# Patient Record
Sex: FEMALE | Race: WHITE | Employment: FULL TIME | URBAN - METROPOLITAN AREA
[De-identification: names, ages, dates, MRNs, and addresses within clinical notes are randomized per-mention and may not be internally consistent; named-entity substitution may affect disease eponyms.]

---

## 2019-07-01 ENCOUNTER — HOSPITAL ENCOUNTER (OUTPATIENT)
Dept: LAB | Facility: CLINIC | Age: 59
Setting detail: SPECIMEN
End: 2019-07-01
Attending: ORTHOPAEDIC SURGERY
Payer: COMMERCIAL

## 2019-07-03 RX ORDER — PRAVASTATIN SODIUM 40 MG
40 TABLET ORAL EVERY EVENING
COMMUNITY

## 2019-07-03 RX ORDER — ASCORBIC ACID 500 MG
500 TABLET ORAL EVERY MORNING
COMMUNITY

## 2019-07-03 RX ORDER — LEVOTHYROXINE SODIUM 175 UG/1
175 TABLET ORAL EVERY MORNING
COMMUNITY

## 2019-07-03 RX ORDER — LISINOPRIL AND HYDROCHLOROTHIAZIDE 12.5; 2 MG/1; MG/1
2 TABLET ORAL EVERY MORNING
COMMUNITY

## 2019-07-03 RX ORDER — NAPROXEN 500 MG/1
500 TABLET ORAL 2 TIMES DAILY WITH MEALS
COMMUNITY

## 2019-07-03 RX ORDER — FOLIC ACID 1 MG/1
1 TABLET ORAL 2 TIMES DAILY
COMMUNITY

## 2019-07-03 RX ORDER — SULFASALAZINE 500 MG/1
1000 TABLET ORAL 2 TIMES DAILY
COMMUNITY

## 2019-07-16 ENCOUNTER — APPOINTMENT (OUTPATIENT)
Dept: PHYSICAL THERAPY | Facility: CLINIC | Age: 59
End: 2019-07-16
Attending: ORTHOPAEDIC SURGERY
Payer: COMMERCIAL

## 2019-07-16 ENCOUNTER — APPOINTMENT (OUTPATIENT)
Dept: GENERAL RADIOLOGY | Facility: CLINIC | Age: 59
End: 2019-07-16
Attending: ORTHOPAEDIC SURGERY
Payer: COMMERCIAL

## 2019-07-16 ENCOUNTER — ANESTHESIA (OUTPATIENT)
Dept: SURGERY | Facility: CLINIC | Age: 59
End: 2019-07-16
Payer: COMMERCIAL

## 2019-07-16 ENCOUNTER — HOSPITAL ENCOUNTER (OUTPATIENT)
Facility: CLINIC | Age: 59
Discharge: HOME | End: 2019-07-18
Attending: ORTHOPAEDIC SURGERY | Admitting: ORTHOPAEDIC SURGERY
Payer: COMMERCIAL

## 2019-07-16 ENCOUNTER — ANESTHESIA EVENT (OUTPATIENT)
Dept: SURGERY | Facility: CLINIC | Age: 59
End: 2019-07-16
Payer: COMMERCIAL

## 2019-07-16 DIAGNOSIS — Z96.652 STATUS POST TOTAL LEFT KNEE REPLACEMENT: Primary | ICD-10-CM

## 2019-07-16 PROBLEM — Z96.659 TOTAL KNEE REPLACEMENT STATUS: Status: ACTIVE | Noted: 2019-07-16

## 2019-07-16 LAB — POTASSIUM SERPL-SCNC: 3.5 MMOL/L (ref 3.4–5.3)

## 2019-07-16 PROCEDURE — 25000128 H RX IP 250 OP 636: Performed by: NURSE ANESTHETIST, CERTIFIED REGISTERED

## 2019-07-16 PROCEDURE — 36000093 ZZH SURGERY LEVEL 4 1ST 30 MIN: Performed by: ORTHOPAEDIC SURGERY

## 2019-07-16 PROCEDURE — 25800030 ZZH RX IP 258 OP 636: Performed by: ANESTHESIOLOGY

## 2019-07-16 PROCEDURE — 71000012 ZZH RECOVERY PHASE 1 LEVEL 1 FIRST HR: Performed by: ORTHOPAEDIC SURGERY

## 2019-07-16 PROCEDURE — C1776 JOINT DEVICE (IMPLANTABLE): HCPCS | Performed by: ORTHOPAEDIC SURGERY

## 2019-07-16 PROCEDURE — 25000125 ZZHC RX 250: Performed by: ORTHOPAEDIC SURGERY

## 2019-07-16 PROCEDURE — 25000125 ZZHC RX 250: Performed by: NURSE ANESTHETIST, CERTIFIED REGISTERED

## 2019-07-16 PROCEDURE — 27110028 ZZH OR GENERAL SUPPLY NON-STERILE: Performed by: ORTHOPAEDIC SURGERY

## 2019-07-16 PROCEDURE — 25000128 H RX IP 250 OP 636: Performed by: ANESTHESIOLOGY

## 2019-07-16 PROCEDURE — 25800030 ZZH RX IP 258 OP 636: Performed by: ORTHOPAEDIC SURGERY

## 2019-07-16 PROCEDURE — 36415 COLL VENOUS BLD VENIPUNCTURE: CPT | Performed by: ANESTHESIOLOGY

## 2019-07-16 PROCEDURE — 97161 PT EVAL LOW COMPLEX 20 MIN: CPT | Mod: GP

## 2019-07-16 PROCEDURE — 71000013 ZZH RECOVERY PHASE 1 LEVEL 1 EA ADDTL HR: Performed by: ORTHOPAEDIC SURGERY

## 2019-07-16 PROCEDURE — 97116 GAIT TRAINING THERAPY: CPT | Mod: GP

## 2019-07-16 PROCEDURE — 37000008 ZZH ANESTHESIA TECHNICAL FEE, 1ST 30 MIN: Performed by: ORTHOPAEDIC SURGERY

## 2019-07-16 PROCEDURE — 25800025 ZZH RX 258: Performed by: ORTHOPAEDIC SURGERY

## 2019-07-16 PROCEDURE — 27810169 ZZH OR IMPLANT GENERAL: Performed by: ORTHOPAEDIC SURGERY

## 2019-07-16 PROCEDURE — 40000985 XR KNEE PORT LT 1/2 VW: Mod: LT

## 2019-07-16 PROCEDURE — 37000009 ZZH ANESTHESIA TECHNICAL FEE, EACH ADDTL 15 MIN: Performed by: ORTHOPAEDIC SURGERY

## 2019-07-16 PROCEDURE — 97530 THERAPEUTIC ACTIVITIES: CPT | Mod: GP

## 2019-07-16 PROCEDURE — 97110 THERAPEUTIC EXERCISES: CPT | Mod: GP

## 2019-07-16 PROCEDURE — 36000063 ZZH SURGERY LEVEL 4 EA 15 ADDTL MIN: Performed by: ORTHOPAEDIC SURGERY

## 2019-07-16 PROCEDURE — 84132 ASSAY OF SERUM POTASSIUM: CPT | Performed by: ANESTHESIOLOGY

## 2019-07-16 PROCEDURE — 25000132 ZZH RX MED GY IP 250 OP 250 PS 637: Performed by: ORTHOPAEDIC SURGERY

## 2019-07-16 PROCEDURE — 40000171 ZZH STATISTIC PRE-PROCEDURE ASSESSMENT III: Performed by: ORTHOPAEDIC SURGERY

## 2019-07-16 PROCEDURE — 25000128 H RX IP 250 OP 636: Performed by: ORTHOPAEDIC SURGERY

## 2019-07-16 PROCEDURE — 27210794 ZZH OR GENERAL SUPPLY STERILE: Performed by: ORTHOPAEDIC SURGERY

## 2019-07-16 DEVICE — BONE CEMENT STRK SIMPLEX P SPEEDSET 6192-1-001: Type: IMPLANTABLE DEVICE | Site: KNEE | Status: FUNCTIONAL

## 2019-07-16 DEVICE — IMPLANTABLE DEVICE: Type: IMPLANTABLE DEVICE | Site: KNEE | Status: FUNCTIONAL

## 2019-07-16 DEVICE — IMP TIB BASE JJ ATTUNE FX BR SYS CEM SZ4 150670004: Type: IMPLANTABLE DEVICE | Site: KNEE | Status: FUNCTIONAL

## 2019-07-16 RX ORDER — MEPERIDINE HYDROCHLORIDE 25 MG/ML
12.5 INJECTION INTRAMUSCULAR; INTRAVENOUS; SUBCUTANEOUS EVERY 5 MIN PRN
Status: DISCONTINUED | OUTPATIENT
Start: 2019-07-16 | End: 2019-07-16 | Stop reason: HOSPADM

## 2019-07-16 RX ORDER — DEXAMETHASONE SODIUM PHOSPHATE 4 MG/ML
INJECTION, SOLUTION INTRA-ARTICULAR; INTRALESIONAL; INTRAMUSCULAR; INTRAVENOUS; SOFT TISSUE PRN
Status: DISCONTINUED | OUTPATIENT
Start: 2019-07-16 | End: 2019-07-16

## 2019-07-16 RX ORDER — HYDROMORPHONE HYDROCHLORIDE 1 MG/ML
0.2 INJECTION, SOLUTION INTRAMUSCULAR; INTRAVENOUS; SUBCUTANEOUS
Status: DISCONTINUED | OUTPATIENT
Start: 2019-07-16 | End: 2019-07-18 | Stop reason: HOSPADM

## 2019-07-16 RX ORDER — BUPIVACAINE HYDROCHLORIDE 7.5 MG/ML
INJECTION, SOLUTION INTRASPINAL PRN
Status: DISCONTINUED | OUTPATIENT
Start: 2019-07-16 | End: 2019-07-16

## 2019-07-16 RX ORDER — KETOROLAC TROMETHAMINE 30 MG/ML
30 INJECTION, SOLUTION INTRAMUSCULAR; INTRAVENOUS EVERY 6 HOURS
Status: COMPLETED | OUTPATIENT
Start: 2019-07-16 | End: 2019-07-17

## 2019-07-16 RX ORDER — ONDANSETRON 2 MG/ML
4 INJECTION INTRAMUSCULAR; INTRAVENOUS EVERY 30 MIN PRN
Status: DISCONTINUED | OUTPATIENT
Start: 2019-07-16 | End: 2019-07-16 | Stop reason: HOSPADM

## 2019-07-16 RX ORDER — PROPOFOL 10 MG/ML
INJECTION, EMULSION INTRAVENOUS CONTINUOUS PRN
Status: DISCONTINUED | OUTPATIENT
Start: 2019-07-16 | End: 2019-07-16

## 2019-07-16 RX ORDER — CEFAZOLIN SODIUM 1 G/3ML
1 INJECTION, POWDER, FOR SOLUTION INTRAMUSCULAR; INTRAVENOUS EVERY 8 HOURS
Status: DISCONTINUED | OUTPATIENT
Start: 2019-07-16 | End: 2019-07-16 | Stop reason: HOSPADM

## 2019-07-16 RX ORDER — NALOXONE HYDROCHLORIDE 0.4 MG/ML
.1-.4 INJECTION, SOLUTION INTRAMUSCULAR; INTRAVENOUS; SUBCUTANEOUS
Status: DISCONTINUED | OUTPATIENT
Start: 2019-07-16 | End: 2019-07-16

## 2019-07-16 RX ORDER — HYDROXYZINE HYDROCHLORIDE 25 MG/1
25 TABLET, FILM COATED ORAL EVERY 6 HOURS PRN
Status: DISCONTINUED | OUTPATIENT
Start: 2019-07-16 | End: 2019-07-16

## 2019-07-16 RX ORDER — SODIUM CHLORIDE, SODIUM LACTATE, POTASSIUM CHLORIDE, CALCIUM CHLORIDE 600; 310; 30; 20 MG/100ML; MG/100ML; MG/100ML; MG/100ML
INJECTION, SOLUTION INTRAVENOUS CONTINUOUS
Status: DISCONTINUED | OUTPATIENT
Start: 2019-07-16 | End: 2019-07-16 | Stop reason: HOSPADM

## 2019-07-16 RX ORDER — MAGNESIUM HYDROXIDE 1200 MG/15ML
LIQUID ORAL PRN
Status: DISCONTINUED | OUTPATIENT
Start: 2019-07-16 | End: 2019-07-16 | Stop reason: HOSPADM

## 2019-07-16 RX ORDER — HYDROXYZINE HYDROCHLORIDE 10 MG/1
10 TABLET, FILM COATED ORAL EVERY 6 HOURS PRN
Qty: 30 TABLET | Refills: 0 | Status: SHIPPED | OUTPATIENT
Start: 2019-07-16 | End: 2019-07-17

## 2019-07-16 RX ORDER — HYDROMORPHONE HYDROCHLORIDE 2 MG/1
2 TABLET ORAL EVERY 4 HOURS PRN
Qty: 12 TABLET | Refills: 0 | Status: SHIPPED | OUTPATIENT
Start: 2019-07-16 | End: 2019-07-17

## 2019-07-16 RX ORDER — LIDOCAINE 40 MG/G
CREAM TOPICAL
Status: DISCONTINUED | OUTPATIENT
Start: 2019-07-16 | End: 2019-07-18 | Stop reason: HOSPADM

## 2019-07-16 RX ORDER — HYDROMORPHONE HYDROCHLORIDE 1 MG/ML
.3-.5 INJECTION, SOLUTION INTRAMUSCULAR; INTRAVENOUS; SUBCUTANEOUS EVERY 5 MIN PRN
Status: DISCONTINUED | OUTPATIENT
Start: 2019-07-16 | End: 2019-07-16 | Stop reason: HOSPADM

## 2019-07-16 RX ORDER — ASPIRIN 325 MG
325 TABLET ORAL DAILY
Status: DISCONTINUED | OUTPATIENT
Start: 2019-07-16 | End: 2019-07-16

## 2019-07-16 RX ORDER — LIDOCAINE 40 MG/G
CREAM TOPICAL
Status: DISCONTINUED | OUTPATIENT
Start: 2019-07-16 | End: 2019-07-16 | Stop reason: HOSPADM

## 2019-07-16 RX ORDER — ACETAMINOPHEN 325 MG/1
650 TABLET ORAL EVERY 4 HOURS PRN
Status: DISCONTINUED | OUTPATIENT
Start: 2019-07-16 | End: 2019-07-18 | Stop reason: HOSPADM

## 2019-07-16 RX ORDER — LORAZEPAM 2 MG/ML
.5-1 INJECTION INTRAMUSCULAR
Status: DISCONTINUED | OUTPATIENT
Start: 2019-07-16 | End: 2019-07-16 | Stop reason: HOSPADM

## 2019-07-16 RX ORDER — FENTANYL CITRATE 50 UG/ML
INJECTION, SOLUTION INTRAMUSCULAR; INTRAVENOUS PRN
Status: DISCONTINUED | OUTPATIENT
Start: 2019-07-16 | End: 2019-07-16

## 2019-07-16 RX ORDER — NALOXONE HYDROCHLORIDE 0.4 MG/ML
.1-.4 INJECTION, SOLUTION INTRAMUSCULAR; INTRAVENOUS; SUBCUTANEOUS
Status: DISCONTINUED | OUTPATIENT
Start: 2019-07-16 | End: 2019-07-18 | Stop reason: HOSPADM

## 2019-07-16 RX ORDER — HYDROMORPHONE HYDROCHLORIDE 2 MG/1
2 TABLET ORAL
Status: DISCONTINUED | OUTPATIENT
Start: 2019-07-16 | End: 2019-07-17

## 2019-07-16 RX ORDER — ONDANSETRON 4 MG/1
4 TABLET, ORALLY DISINTEGRATING ORAL EVERY 6 HOURS PRN
Status: DISCONTINUED | OUTPATIENT
Start: 2019-07-16 | End: 2019-07-18 | Stop reason: HOSPADM

## 2019-07-16 RX ORDER — HYDROXYZINE HYDROCHLORIDE 25 MG/1
50 TABLET, FILM COATED ORAL EVERY 6 HOURS PRN
Status: DISCONTINUED | OUTPATIENT
Start: 2019-07-16 | End: 2019-07-18 | Stop reason: HOSPADM

## 2019-07-16 RX ORDER — CEFAZOLIN SODIUM 2 G/100ML
INJECTION, SOLUTION INTRAVENOUS PRN
Status: DISCONTINUED | OUTPATIENT
Start: 2019-07-16 | End: 2019-07-16

## 2019-07-16 RX ORDER — ALBUTEROL SULFATE 0.83 MG/ML
2.5 SOLUTION RESPIRATORY (INHALATION)
Status: DISCONTINUED | OUTPATIENT
Start: 2019-07-16 | End: 2019-07-16 | Stop reason: HOSPADM

## 2019-07-16 RX ORDER — HYDROXYZINE HYDROCHLORIDE 25 MG/1
25 TABLET, FILM COATED ORAL EVERY 6 HOURS PRN
Status: DISCONTINUED | OUTPATIENT
Start: 2019-07-16 | End: 2019-07-18 | Stop reason: HOSPADM

## 2019-07-16 RX ORDER — CEFAZOLIN SODIUM 2 G/100ML
2 INJECTION, SOLUTION INTRAVENOUS EVERY 8 HOURS
Status: COMPLETED | OUTPATIENT
Start: 2019-07-16 | End: 2019-07-17

## 2019-07-16 RX ORDER — ALBUTEROL SULFATE 0.83 MG/ML
2.5 SOLUTION RESPIRATORY (INHALATION) EVERY 4 HOURS PRN
Status: DISCONTINUED | OUTPATIENT
Start: 2019-07-16 | End: 2019-07-16 | Stop reason: HOSPADM

## 2019-07-16 RX ORDER — KETOROLAC TROMETHAMINE 15 MG/ML
15 INJECTION, SOLUTION INTRAMUSCULAR; INTRAVENOUS
Status: DISCONTINUED | OUTPATIENT
Start: 2019-07-16 | End: 2019-07-16 | Stop reason: HOSPADM

## 2019-07-16 RX ORDER — DEXTROSE MONOHYDRATE, SODIUM CHLORIDE, AND POTASSIUM CHLORIDE 50; 1.49; 4.5 G/1000ML; G/1000ML; G/1000ML
INJECTION, SOLUTION INTRAVENOUS CONTINUOUS
Status: DISCONTINUED | OUTPATIENT
Start: 2019-07-16 | End: 2019-07-18 | Stop reason: HOSPADM

## 2019-07-16 RX ORDER — EPHEDRINE SULFATE 50 MG/ML
INJECTION, SOLUTION INTRAMUSCULAR; INTRAVENOUS; SUBCUTANEOUS PRN
Status: DISCONTINUED | OUTPATIENT
Start: 2019-07-16 | End: 2019-07-16

## 2019-07-16 RX ORDER — PROCHLORPERAZINE MALEATE 10 MG
10 TABLET ORAL EVERY 6 HOURS PRN
Status: DISCONTINUED | OUTPATIENT
Start: 2019-07-16 | End: 2019-07-18 | Stop reason: HOSPADM

## 2019-07-16 RX ORDER — AMOXICILLIN 250 MG
1-2 CAPSULE ORAL 2 TIMES DAILY
Qty: 30 TABLET | Refills: 0 | Status: SHIPPED | OUTPATIENT
Start: 2019-07-16

## 2019-07-16 RX ORDER — FENTANYL CITRATE 50 UG/ML
25-50 INJECTION, SOLUTION INTRAMUSCULAR; INTRAVENOUS
Status: DISCONTINUED | OUTPATIENT
Start: 2019-07-16 | End: 2019-07-16 | Stop reason: HOSPADM

## 2019-07-16 RX ORDER — LIDOCAINE HYDROCHLORIDE 20 MG/ML
INJECTION, SOLUTION INFILTRATION; PERINEURAL PRN
Status: DISCONTINUED | OUTPATIENT
Start: 2019-07-16 | End: 2019-07-16

## 2019-07-16 RX ORDER — ONDANSETRON 2 MG/ML
INJECTION INTRAMUSCULAR; INTRAVENOUS PRN
Status: DISCONTINUED | OUTPATIENT
Start: 2019-07-16 | End: 2019-07-16

## 2019-07-16 RX ORDER — CALCIUM CARBONATE 500 MG/1
1000 TABLET, CHEWABLE ORAL 4 TIMES DAILY PRN
Status: DISCONTINUED | OUTPATIENT
Start: 2019-07-16 | End: 2019-07-18 | Stop reason: HOSPADM

## 2019-07-16 RX ORDER — FENTANYL CITRATE 50 UG/ML
50 INJECTION, SOLUTION INTRAMUSCULAR; INTRAVENOUS
Status: COMPLETED | OUTPATIENT
Start: 2019-07-16 | End: 2019-07-16

## 2019-07-16 RX ORDER — ONDANSETRON 4 MG/1
4 TABLET, ORALLY DISINTEGRATING ORAL EVERY 30 MIN PRN
Status: DISCONTINUED | OUTPATIENT
Start: 2019-07-16 | End: 2019-07-16 | Stop reason: HOSPADM

## 2019-07-16 RX ORDER — ONDANSETRON 2 MG/ML
4 INJECTION INTRAMUSCULAR; INTRAVENOUS EVERY 6 HOURS PRN
Status: DISCONTINUED | OUTPATIENT
Start: 2019-07-16 | End: 2019-07-18 | Stop reason: HOSPADM

## 2019-07-16 RX ADMIN — CEFAZOLIN SODIUM 2 G: 2 INJECTION, SOLUTION INTRAVENOUS at 16:53

## 2019-07-16 RX ADMIN — HYDROMORPHONE HYDROCHLORIDE 0.2 MG: 1 INJECTION, SOLUTION INTRAMUSCULAR; INTRAVENOUS; SUBCUTANEOUS at 14:10

## 2019-07-16 RX ADMIN — ONDANSETRON 4 MG: 2 INJECTION INTRAMUSCULAR; INTRAVENOUS at 08:13

## 2019-07-16 RX ADMIN — CEFAZOLIN SODIUM 2 G: 2 INJECTION, SOLUTION INTRAVENOUS at 08:00

## 2019-07-16 RX ADMIN — BUPIVACAINE HYDROCHLORIDE IN DEXTROSE 13.5 MG: 7.5 INJECTION, SOLUTION SUBARACHNOID at 07:59

## 2019-07-16 RX ADMIN — KETOROLAC TROMETHAMINE 30 MG: 30 INJECTION, SOLUTION INTRAMUSCULAR at 16:53

## 2019-07-16 RX ADMIN — FENTANYL CITRATE 25 MCG: 50 INJECTION, SOLUTION INTRAMUSCULAR; INTRAVENOUS at 07:55

## 2019-07-16 RX ADMIN — HYDROMORPHONE HYDROCHLORIDE 2 MG: 2 TABLET ORAL at 22:27

## 2019-07-16 RX ADMIN — KETOROLAC TROMETHAMINE 30 MG: 30 INJECTION, SOLUTION INTRAMUSCULAR at 11:18

## 2019-07-16 RX ADMIN — LIDOCAINE HYDROCHLORIDE 20 MG: 20 INJECTION, SOLUTION INFILTRATION; PERINEURAL at 08:05

## 2019-07-16 RX ADMIN — ASPIRIN 325 MG: 325 TABLET, DELAYED RELEASE ORAL at 11:18

## 2019-07-16 RX ADMIN — HYDROMORPHONE HYDROCHLORIDE 2 MG: 2 TABLET ORAL at 19:07

## 2019-07-16 RX ADMIN — Medication 5 MG: at 08:53

## 2019-07-16 RX ADMIN — MIDAZOLAM 1 MG: 1 INJECTION INTRAMUSCULAR; INTRAVENOUS at 07:55

## 2019-07-16 RX ADMIN — BUPIVACAINE HYDROCHLORIDE 30 ML GIVEN: 5 INJECTION, SOLUTION EPIDURAL; INTRACAUDAL; PERINEURAL at 07:26

## 2019-07-16 RX ADMIN — DEXAMETHASONE SODIUM PHOSPHATE 4 MG: 4 INJECTION, SOLUTION INTRA-ARTICULAR; INTRALESIONAL; INTRAMUSCULAR; INTRAVENOUS; SOFT TISSUE at 08:05

## 2019-07-16 RX ADMIN — MIDAZOLAM HYDROCHLORIDE 2 MG: 1 INJECTION, SOLUTION INTRAMUSCULAR; INTRAVENOUS at 07:24

## 2019-07-16 RX ADMIN — SODIUM CHLORIDE, POTASSIUM CHLORIDE, SODIUM LACTATE AND CALCIUM CHLORIDE: 600; 310; 30; 20 INJECTION, SOLUTION INTRAVENOUS at 08:57

## 2019-07-16 RX ADMIN — PROPOFOL 50 MCG/KG/MIN: 10 INJECTION, EMULSION INTRAVENOUS at 08:05

## 2019-07-16 RX ADMIN — HYDROMORPHONE HYDROCHLORIDE 0.2 MG: 1 INJECTION, SOLUTION INTRAMUSCULAR; INTRAVENOUS; SUBCUTANEOUS at 16:54

## 2019-07-16 RX ADMIN — SODIUM CHLORIDE, POTASSIUM CHLORIDE, SODIUM LACTATE AND CALCIUM CHLORIDE: 600; 310; 30; 20 INJECTION, SOLUTION INTRAVENOUS at 07:24

## 2019-07-16 RX ADMIN — SODIUM CHLORIDE 1 G: 9 INJECTION, SOLUTION INTRAVENOUS at 08:53

## 2019-07-16 RX ADMIN — KETOROLAC TROMETHAMINE 30 MG: 30 INJECTION, SOLUTION INTRAMUSCULAR at 22:27

## 2019-07-16 RX ADMIN — Medication 5 MG: at 08:31

## 2019-07-16 RX ADMIN — POTASSIUM CHLORIDE, DEXTROSE MONOHYDRATE AND SODIUM CHLORIDE: 150; 5; 450 INJECTION, SOLUTION INTRAVENOUS at 11:18

## 2019-07-16 RX ADMIN — CEFAZOLIN SODIUM 2 G: 2 INJECTION, SOLUTION INTRAVENOUS at 23:42

## 2019-07-16 RX ADMIN — SODIUM CHLORIDE 1 G: 9 INJECTION, SOLUTION INTRAVENOUS at 08:10

## 2019-07-16 RX ADMIN — FENTANYL CITRATE 25 MCG: 50 INJECTION INTRAMUSCULAR; INTRAVENOUS at 07:23

## 2019-07-16 ASSESSMENT — ENCOUNTER SYMPTOMS
SEIZURES: 0
DYSRHYTHMIAS: 0

## 2019-07-16 ASSESSMENT — COPD QUESTIONNAIRES: COPD: 0

## 2019-07-16 ASSESSMENT — MIFFLIN-ST. JEOR: SCORE: 1724.41

## 2019-07-16 ASSESSMENT — LIFESTYLE VARIABLES: TOBACCO_USE: 0

## 2019-07-16 NOTE — ANESTHESIA CARE TRANSFER NOTE
Patient: Shana Garcia    Procedure(s):  KNEE REPLACEMENT, LEFT KNEE    Diagnosis: LEFT KNEE END STAGE OSTEOARTHRITIS  Diagnosis Additional Information: No value filed.    Anesthesia Type:   Spinal, Periph. Nerve Block for postop pain     Note:  Airway :Face Mask  Patient transferred to:PACU  Comments: Pt exhibits spontaneous respirations, all monitors and alarms on in PACU, VSS, patent IV, report and transfer of care to RN.  Handoff Report: Identifed the Patient, Identified the Reponsible Provider, Reviewed the pertinent medical history, Discussed the surgical course, Reviewed Intra-OP anesthesia mangement and issues during anesthesia, Set expectations for post-procedure period and Allowed opportunity for questions and acknowledgement of understanding      Vitals: (Last set prior to Anesthesia Care Transfer)    CRNA VITALS  7/16/2019 0836 - 7/16/2019 0913      7/16/2019             Resp Rate (set):  10                Electronically Signed By: RYDER Eric CRNA  July 16, 2019  9:13 AM

## 2019-07-16 NOTE — PROGRESS NOTES
Arrived from Recovery room.  A&O, able to make needs known.   Oriented to room/unit.  Denies surgical pain, ice pack applied.

## 2019-07-16 NOTE — PLAN OF CARE
PT:  Discharge Planner PT   Patient plan for discharge: Return home tomorrow with OPPT and spouse  Current status: Orders received, eval completed, treatment initiated. Pt admitted under outpatient status and is POD 0 L TKA. Prior to admit pt was living with her spouse in an apartment with 21 stairs to enter and no elevator. Pt was using a SEC and has been independent with mobiltiy. Currently requires SBA for bed mobility, CGA sit to/from stand with FWW and KI, CGA for gait of 150 ft with FWW and KI with no buckling noted. Participated well in LE strengthening and ROM activities. Pt demonstrates pain, dec strength, balance, ROM and difficulty ambulating and transferring and would benefit from skilled PT services in order to improve this. Unable to assess ROM as no goniometer present.  Barriers to return to prior living situation: Has not yet done stairs and has many of them  Recommendations for discharge: Return home with OPPT and spouse assist as planned  Rationale for recommendations: Pt would benefit from continued PT to improve strength, balance, mobility to increase independence, reduce falls risk and increase safety. Pt is moving well and will be safe to return home pending her ability to navigate stairs.       Entered by: Sylvia Brooks 07/16/2019 5:14 PM

## 2019-07-16 NOTE — ANESTHESIA PROCEDURE NOTES
Peripheral nerve/Neuraxial procedure note : intrathecal (Combined spinal epidural technique for intrathecal access)  Pre-Procedure  Performed by Spencer Taylor MD  Location: OR      Pre-Anesthestic Checklist: patient identified, IV checked, risks and benefits discussed, informed consent, monitors and equipment checked, pre-op evaluation and at physician/surgeon's request    Timeout  Correct Patient: Yes   Correct Procedure: Yes   Correct Site: Yes   Correct Laterality: N/A   Correct Position: Yes   Site Marked: N/A   .   Procedure Documentation    .    Procedure:    Intrathecal (Combined spinal epidural technique for intrathecal access).  Insertion Site:L3-4  (midline approach)      Patient Prep;mask, sterile gloves, chlorhexidine gluconate and isopropyl alcohol, patient draped.  .  Needle: Liz-Kaleb Spinal Needle (gauge): 24  Spinal/LP Needle Length (inches): 5 # of attempts: 1 and # of redirects:  Introducer used (Used 17G Tuohy from epidural kit) .       Assessment/Narrative  Paresthesias: No.  .  .  clear CSF fluid removed . Comments:  The patient was prepped and draped in a sterile fashion.  Topical anesthesia was injected subcutaneously using 1% lidocaine.  A 17G Tuohy needle was advanced until ligamentum flavum was encountered at the the L3-4 level.  AUNDREA to saline occurred at 7 cm and 5 inch 24G Liz Kaleb needle was advanced through the Tuohy.  Clear CSF obtained.  CSF freely aspirated after injection of 13.5 mg bupivacaine.  No paresthesias reported by patient, who tolerated the procedure well.

## 2019-07-16 NOTE — PROGRESS NOTES
07/16/19 1604   Quick Adds   Type of Visit Initial PT Evaluation   Living Environment   Lives With spouse   Living Arrangements apartment   Home Accessibility stairs to enter home   Number of Stairs, Main Entrance other (see comments)  (21)   Stair Railings, Main Entrance railings on both sides of stairs   Living Environment Comment No elevator in the building   Self-Care   Usual Activity Tolerance good   Current Activity Tolerance moderate   Regular Exercise No   Equipment Currently Used at Home cane, straight  (owns FWW, raised toilet seat and shower chair)   Functional Level Prior   Ambulation 1-->assistive equipment   Transferring 1-->assistive equipment   Fall history within last six months yes   Number of times patient has fallen within last six months 1   Which of the above functional risks had a recent onset or change? none   General Information   Onset of Illness/Injury or Date of Surgery - Date 07/16/19   Referring Physician Anabel Kent MD   Patient/Family Goals Statement Return home with spouse and OPPT   Pertinent History of Current Problem (include personal factors and/or comorbidities that impact the POC) Pt admitted under outpatient status and is POD 0 L TKA. PMH: HTN, HLD, VALERIA, anxiety, depression.   Precautions/Limitations fall precautions   Weight-Bearing Status - LLE weight-bearing as tolerated   Weight-Bearing Status - RLE full weight-bearing   General Observations Spouse present   Cognitive Status Examination   Orientation orientation to person, place and time   Level of Consciousness alert   Follows Commands and Answers Questions 100% of the time;able to follow multistep instructions   Personal Safety and Judgment intact   Memory intact   Pain Assessment   Patient Currently in Pain Yes, see Vital Sign flowsheet   Posture    Posture Forward head position;Protracted shoulders   Range of Motion (ROM)   ROM Comment R LE WFL, L LE limited by pain and surgery   Strength   Strength Comments R  "LE WFL, L LE functionally weak   Bed Mobility   Bed Mobility Comments Supine to sit with SBA   Transfer Skills   Transfer Comments Sit to stand with FWW and KI donned   Gait   Gait Comments Pt amb 10 ft with FWW and CGA with KI donned, no buckling noted.   Balance   Balance Comments Balance steady with FWW and KI   Sensory Examination   Sensory Perception Comments Denies any numbness or tingling   General Therapy Interventions   Planned Therapy Interventions bed mobility training;gait training;ROM;strengthening;transfer training   Clinical Impression   Criteria for Skilled Therapeutic Intervention yes, treatment indicated   PT Diagnosis Difficulty ambulating   Influenced by the following impairments Pain, dec strength, ROM, activity tolerance   Functional limitations due to impairments Difficulty ambulating and transferring   Clinical Presentation Stable/Uncomplicated   Clinical Presentation Rationale medically stable post-op   Clinical Decision Making (Complexity) Low complexity   Therapy Frequency 2x/day   Predicted Duration of Therapy Intervention (days/wks) 3 days   Anticipated Discharge Disposition Home with Outpatient Therapy   Risk & Benefits of therapy have been explained Yes   Patient, Family & other staff in agreement with plan of care Yes   [facility] [employer] \"6 Clicks\"   2016, Trustees of [facility], under license to [employer].  All rights reserved.   6 Clicks Short Forms Basic Mobility Inpatient Short Form   [facility] [employer]  \"6 Clicks\" V.2 Basic Mobility Inpatient Short Form   1. Turning from your back to your side while in a flat bed without using bedrails? 4 - None   2. Moving from lying on your back to sitting on the side of a flat bed without using bedrails? 4 - None   3. Moving to and from a bed to a chair (including a wheelchair)? 3 - A Little   4. Standing up from a chair using your arms (e.g., wheelchair, or bedside chair)? 3 - A Little   5. To walk in hospital room? " 3 - A Little   6. Climbing 3-5 steps with a railing? 3 - A Little   Basic Mobility Raw Score (Score out of 24.Lower scores equate to lower levels of function) 20   Total Evaluation Time   Total Evaluation Time (Minutes) 15

## 2019-07-16 NOTE — ANESTHESIA POSTPROCEDURE EVALUATION
Patient: Shana Garcia    Procedure(s):  KNEE REPLACEMENT, LEFT KNEE    Diagnosis:LEFT KNEE END STAGE OSTEOARTHRITIS  Diagnosis Additional Information: No value filed.    Anesthesia Type:  Spinal, Periph. Nerve Block for postop pain    Note:  Anesthesia Post Evaluation    Patient location during evaluation: PACU  Patient participation: Able to participate in evaluation but full recovery from regional anesthesia has not yet ocurrred but is anticipated to occur within 48 hours  Level of consciousness: awake and alert  Pain management: adequate  Airway patency: patent  Cardiovascular status: acceptable, hemodynamically stable and blood pressure returned to baseline  Respiratory status: acceptable and room air  Hydration status: acceptable  PONV: none     Anesthetic complications: None          Last vitals:  Vitals:    07/16/19 1010 07/16/19 1030 07/16/19 1035   BP: 125/82 118/77    Pulse: 67 70    Resp: 16 17    Temp: 35.7  C (96.3  F) 35.8  C (96.4  F)    SpO2: 95% 94% 94%         Electronically Signed By: Brigid Arellano MD  July 16, 2019  10:42 AM

## 2019-07-16 NOTE — PROGRESS NOTES
Admission medication history interview status for the 7/16/2019  admission is complete. See EPIC admission navigator for prior to admission medications     Medication history source reliability:Good    Medication history interview source(s):Patient    Medication history resources (including written lists, pill bottles, clinic record):None    Primary pharmacy.[facility]    Additional medication history information not noted on PTA med list :None    Time spent in this activity: 45 minutes    Prior to Admission medications    Medication Sig Last Dose Taking? Auth Provider   acetaminophen-codeine (TYLENOL #3) 300-30 MG tablet Take 1-2 tablets by mouth every 6 hours as needed for severe pain 7/14/2019 at prn Yes Reported, Patient   calcium carbonate 600 mg-vitamin D 400 units (CALTRATE) 600-400 MG-UNIT per tablet Take 1-2 tablets by mouth every morning 7/15/2019 at am Yes Reported, Patient   folic acid (FOLVITE) 1 MG tablet Take 1 mg by mouth 2 times daily (Tuesdays through Sundays) 7/13/2019 Yes Reported, Patient   folic acid 5 MG CAPS Take 5 mg by mouth once a week (On Monday)   With Methotrexate Day more than a week Yes Reported, Patient   levothyroxine (SYNTHROID/LEVOTHROID) 175 MCG tablet Take 175 mcg by mouth every morning 7/16/2019 at 0415 Yes Reported, Patient   lisinopril-hydrochlorothiazide (PRINZIDE/ZESTORETIC) 20-12.5 MG tablet Take 2 tablets by mouth every morning 7/15/2019 at am Yes Reported, Patient   MELATONIN PO Take 1 tablet by mouth nightly as needed 7/14/2019 at prn Yes Reported, Patient   methotrexate 2.5 MG tablet Take 25 mg by mouth every 7 days (Monday Evenings)   (takes 10x2.5mg tablet = 25mg dose) more than a week Yes Reported, Patient   naproxen (NAPROSYN) 500 MG tablet Take 500 mg by mouth 2 times daily (with meals) 7/9/2019 Yes Reported, Patient   Omega-3 Fatty Acids (FISH OIL PO) Take 1 tablet by mouth daily more than a week Yes Reported, Patient   omeprazole (PRILOSEC) 20 MG DR capsule Take  20 mg by mouth every morning 7/16/2019 at 0415 Yes Reported, Patient   pravastatin (PRAVACHOL) 40 MG tablet Take 40 mg by mouth every evening 7/15/2019 at pm Yes Reported, Patient   sulfaSALAzine (AZULFIDINE) 500 MG tablet Take 1,000 mg by mouth 2 times daily (2 x 500 mg = 1000 mg dose) 7/16/2019 at 0415 Yes Reported, Patient   vitamin C (ASCORBIC ACID) 500 MG tablet Take 500 mg by mouth every morning 7/15/2019 at am Yes Reported, Patient

## 2019-07-16 NOTE — PROGRESS NOTES
Patient has returned to baseline mental status - goal met  Patient vital signs are at baseline - goa met  Patient able to ambulate as they were prior to admission or with assist devices provided by therapies during their stay - not met, PT at 1600  Patient voiding - goal met, voiding adequately per bedside commode, assist of 1, walker and gait belt  Patient able to tolerate oral intake - partially met, tolerating clears and crackers  Patient has adequate pain control using Oral analgesics - not met, pain managed w/ Dilaudid IV

## 2019-07-16 NOTE — OP NOTE
Procedure Date: 07/16/2019      SURGEON:  Anabel Kent MD.      ASSISTANT:  Nellie Mckeon PA-C.       Nellie Mckeon PA-C was required for retraction of soft tissues, protection of neurovascular structures, visualization and positioning of the leg.      PREOPERATIVE DIAGNOSIS:  Left knee end-stage osteoarthritis.      POSTOPERATIVE DIAGNOSIS:  Left knee end-stage osteoarthritis.      PROCEDURE:  Total knee arthroplasty, left.      IMPLANTS:  DePuy Attune size 4 femur, size 4 tibia, size 7 mm polyethylene insert, size 35 mm patellar button.      INDICATIONS:  Juana Chavez is a 59-year-old woman with end-stage osteoarthritis of her left knee who has had extensive nonoperative measures.  Discussion was had regarding these findings.  Continued nonoperative and operative risks, benefits, alternatives, recovery were reviewed, questions answered.  She elected to proceed.      DESCRIPTION OF PROCEDURE:  After informed consent was obtained and operative site marked, an adductor canal block was administered by Anesthesia in the preoperative holding suite.  She was then brought to the operating room where spinal anesthetic and sedation were administered.  Her left lower extremity was then prepped and draped in the usual sterile fashion.  A timeout taken to confirm operative site, antibiotic administration, procedure and TXA administration.  Her leg was exsanguinated and the tourniquet insufflated to 300 mmHg.  Standard anterior incision was made on the knee.  Sharp dissection carried out through skin and subcutaneous fat.  Medial parapatellar arthrotomy was created.  The knee was brought into hyperflexion and the anterior horns of menisci and fat pad were resected.  Femoral intramedullary drill was used to find the intramedullary canal.  A canal-based distal femoral articular surface resecting guide was positioned and 9 mm cut.  Sizing was then completed of the femur and she was determined to be a size 4.  A 4 jig was  secured and anterior, posterior and chamfer cuts followed by PCL sacrificing box cut was made.  The extramedullary guide was then fashioned on the tibial side with plans to resect 7 mm of proximal tibial articular surface.  Referencing off the medial femoral condyle and correcting for coronal and sagittal alignment, the jig was secured and the cut performed.  The remainder of the cruciates, menisci and marginal osteophytes of the tibia and femur were resected.  Rotational alignment of the tibia was then chosen along with sizing and the fin punched.  Trial femur and subsequent polyethylenes were trialed until I was pleased with range of motion and stability.  In full extension, the articular surface of the patella was resected, cut and sized and all trials were now in place.  The knee was taken through a range of motion and stressed and found to have full range of motion and stability throughout that range of motion and normal patellar tracking.  Irrigation with antibiotic-impregnated solution was carried out.  Periarticular soft tissues were injected with local anesthetic and the bone ends were dried and prepared for final cementation.  Final components were cemented into place.  A deep Hemovac drain was placed.  Further irrigation with antibiotic solution was carried out, evacuated and the arthrotomy was closed followed by layered wound closure of skin.  A complete examination again was carried out showing normal tracking of the patella, full range of motion and stability throughout that range of motion with good lower extremity alignment.  Sterile dressing was applied, tourniquet let down.  She was extubated and taken to the PACU in stable condition.      ESTIMATED BLOOD LOSS:  Minimal.      COMPLICATIONS:  None apparent.      POSTOPERATIVE PLAN:  She may bear weight as tolerated, has unrestricted range of motion.  PT to start later today.  Ice, elevation, intermittent use of oral narcotics for pain and daily  aspirin.  Anticipate discharge home on postoperative day #1 or 2.  She will undergo 24 hours of antibiotic prophylaxis and aspirin for DVT prophylaxis.  Outpatient PT and outpatient followup in my office beginning in 2 weeks.         KETAN MAHAJAN MD             D: 2019   T: 2019   MT:       Name:     KRANTHI PEARSON   MRN:      [id number]        Account:        [id number]   :      1960           Procedure Date: 2019      Document: [id number]

## 2019-07-16 NOTE — ANESTHESIA PREPROCEDURE EVALUATION
Anesthesia Pre-Procedure Evaluation    Patient: Shana Garcia   MRN: [id number] : 1960          Preoperative Diagnosis: LEFT KNEE END STAGE OSTEOARTHRITIS    Procedure(s):  LEFT TOTAL KNEE ARTHROPLASTY (DEPUY ATTUNE)^    Past Medical History:   Diagnosis Date     Anxiety      Arthritis     RA & osteoarthritis     CMC arthritis, thumb, degenerative      Complication of anesthesia     difficult intubation, use glidescope, grade 2 view with glidescope     Depression      DUB (dysfunctional uterine bleeding)      Gastroesophageal reflux disease      Herpes simplex virus infection      Hypercholesteremia      Hyperlipidemia      Hypertension      Hypothyroidism      IFG (impaired fasting glucose)      Obese     morbid obesity > 41     Oral aphthae      Sleep apnea      Past Surgical History:   Procedure Laterality Date     COLONOSCOPY      mucosal prolapse polyp     GYN SURGERY      D & C     OPERATIVE HYSTEROSCOPY WITH MORCELLATOR       ORTHOPEDIC SURGERY Bilateral     foot surgery      wisdom teeth extraction         Anesthesia Evaluation     .             ROS/MED HX    ENT/Pulmonary:     (+)sleep apnea, uses CPAP , . .   (-) tobacco use, asthma and COPD   Neurologic:      (-) seizures, CVA and TIA   Cardiovascular:     (+) Dyslipidemia, hypertension----. : . . . :. . Previous cardiac testing date:results:Stress Testdate: results:The pharmacological stress EKG was negative for ischemia.  Myocardial perfusion was normal.  Overall left ventricular systolic function was normal without regional   wall motion abnormalities.  The post-stress LVEF was visually estimated to   be 65%.  Left ventricular cavity size was normal.ECG reviewed date:2019 results:NSR date: results:         (-) CAD, CHF and arrhythmias   METS/Exercise Tolerance:     Hematologic:         Musculoskeletal: Comment: Osteo- and rheumatoid arthritis  (+) arthritis,  -       GI/Hepatic:     (+) GERD Asymptomatic on medication,       (-) liver disease   Renal/Genitourinary:      (-) renal disease   Endo: Comment: Impaired fasting glucose  BMI 43    (+) thyroid problem hypothyroidism, Obesity, .   (-) Type I DM and Type II DM   Psychiatric:     (+) psychiatric history depression and anxiety      Infectious Disease:         Malignancy:         Other:                          Physical Exam  Normal systems: dental    Airway   Mallampati: III  TM distance: >3 FB  Neck ROM: full    Dental     Cardiovascular   Rhythm and rate: regular      Pulmonary    breath sounds clear to auscultation            No results found for: WBC, HGB, HCT, PLT, CRP, SED, NA, POTASSIUM, CHLORIDE, CO2, BUN, CR, GLC, LUH, PHOS, MAG, ALBUMIN, PROTTOTAL, ALT, AST, GGT, ALKPHOS, BILITOTAL, BILIDIRECT, LIPASE, AMYLASE, HANK, PTT, INR, FIBR, TSH, T4, T3, HCG, HCGS, CKTOTAL, CKMB, TROPN    Preop Vitals  BP Readings from Last 3 Encounters:   No data found for BP    Pulse Readings from Last 3 Encounters:   No data found for Pulse      Resp Readings from Last 3 Encounters:   No data found for Resp    SpO2 Readings from Last 3 Encounters:   No data found for SpO2      Temp Readings from Last 1 Encounters:   No data found for Temp    Ht Readings from Last 1 Encounters:   No data found for Ht      Wt Readings from Last 1 Encounters:   No data found for Wt    There is no height or weight on file to calculate BMI.       Anesthesia Plan      History & Physical Review  History and physical reviewed and following examination; no interval change.    ASA Status:  3 .    NPO Status:  > 8 hours    Plan for Spinal and Periph. Nerve Block for postop pain   PONV prophylaxis:  Ondansetron (or other 5HT-3)  Maintain MAP > or = 80 mmHg, up to 500 ml 5% albumin as needed, phenylephrine as needed      Postoperative Care  Postoperative pain management:  Peripheral nerve block (Single Shot).      Consents  Anesthetic plan, risks, benefits and alternatives discussed with:  Patient..                  Spencer Taylor MD

## 2019-07-16 NOTE — ANESTHESIA PROCEDURE NOTES
Peripheral nerve/Neuraxial procedure note :  Pre-Procedure  Performed by Spencer Taylor MD  Location: OB     Pre-Anesthestic Checklist: patient identified, risks and benefits discussed, informed consent, monitors and equipment checked, pre-op evaluation and at physician/surgeon's request    Timeout  Correct Patient: Yes   Correct Procedure: Yes   Correct Site: Yes   Correct Laterality: N/A   Correct Position: Yes   Site Marked: N/A   .   Procedure Documentation    .    Procedure:  .  Insertion Site:L3-4  (midline approach) Injection technique: LORT saline   Local skin infiltrated with mL of 1% lidocaine.       Patient Prep;chlorhexidine gluconate and isopropyl alcohol.  .  Needle: [place] needle Needle Gauge: 17.    Needle Length (Inches) 5  .   Catheter: 18 G . .  Catheter threaded easily  .  .   .    Assessment/Narrative  .  .  Aspiration negative for heme or CSF  . Test dose of 3 mL at. Test dose negative for signs of intravascular, subdural or intrathecal injection. Comments:  Risks, benefits, alternatives of epidural analgesia discussed with the patient who agrees to proceed.  After a procedural timeout confirming the correct patient and details of the procedure, 1% lidocaine was injected superficially over the skin for topical anesthesia.  A 17 G Tuohy needle was advanced at the above lumbar interspace until contact was felt with ligamentum flavum.  Loss of resistance to saline was encountered at above noted depth.  3 ml of saline was injected to expand the epidural space.  The epidural catheter was then easily threaded to the depth noted above.  Paresthesias were as noted above.Ropivacaine 10 ml bolus via epidural catheter administered at end of procedure.  Patient tolerated well.

## 2019-07-16 NOTE — ANESTHESIA PROCEDURE NOTES
Peripheral nerve/Neuraxial procedure note : Adductor canal  Pre-Procedure  Performed by Spencer Taylor MD  Location: pre-op      Pre-Anesthestic Checklist: patient identified, IV checked, site marked, risks and benefits discussed, informed consent, monitors and equipment checked, pre-op evaluation, at physician/surgeon's request and post-op pain management    Timeout  Correct Patient: Yes   Correct Procedure: Yes   Correct Site: Yes   Correct Laterality: Yes   Correct Position: Yes   Site Marked: Yes   .   Procedure Documentation    .    Procedure:  left  Adductor canal.  Local skin infiltrated with mL of 1% lidocaine.     Ultrasound used to identify targeted nerve, plexus, or vascular marker and placed a needle adjacent to it., Ultrasound was used to visualize the spread of the anesthetic in close proximity to the above stated nerve. A permanent image is entered into the patient's record.  Patient Prep;mask, sterile gloves, chlorhexidine gluconate and isopropyl alcohol.  .  Needle: insulated Needle Gauge: 22.    Needle Length (Inches) 3.13  Insertion Method: Single Shot.       Assessment/Narrative  Paresthesias: No.  .  The placement was negative for: blood aspirated, painful injection and site bleeding.  Bolus given via needle..   Secured via.   Complications:. Comments:  Femoral artery clearly identified deep to the sartorius muscle via ultrasound imaging.  After appropriate timeout procedure, needle was advanced under direct ultrasound guidance.  30 ml of 0.5% bupivacaine with 1:400,000 epinephrine was incrementally injected with negative aspiration every 5 ml.  Patient tolerated procedure well.

## 2019-07-17 ENCOUNTER — APPOINTMENT (OUTPATIENT)
Dept: PHYSICAL THERAPY | Facility: CLINIC | Age: 59
End: 2019-07-17
Attending: ORTHOPAEDIC SURGERY
Payer: COMMERCIAL

## 2019-07-17 LAB — HGB BLD-MCNC: 10.3 G/DL (ref 11.7–15.7)

## 2019-07-17 PROCEDURE — 97110 THERAPEUTIC EXERCISES: CPT | Mod: GP

## 2019-07-17 PROCEDURE — 85018 HEMOGLOBIN: CPT | Performed by: ORTHOPAEDIC SURGERY

## 2019-07-17 PROCEDURE — 25000132 ZZH RX MED GY IP 250 OP 250 PS 637: Performed by: ORTHOPAEDIC SURGERY

## 2019-07-17 PROCEDURE — 36415 COLL VENOUS BLD VENIPUNCTURE: CPT | Performed by: ORTHOPAEDIC SURGERY

## 2019-07-17 PROCEDURE — 25000128 H RX IP 250 OP 636: Performed by: ORTHOPAEDIC SURGERY

## 2019-07-17 PROCEDURE — 97116 GAIT TRAINING THERAPY: CPT | Mod: GP

## 2019-07-17 PROCEDURE — 97530 THERAPEUTIC ACTIVITIES: CPT | Mod: GP

## 2019-07-17 RX ORDER — HYDROXYZINE HYDROCHLORIDE 25 MG/1
25 TABLET, FILM COATED ORAL EVERY 6 HOURS PRN
Qty: 25 TABLET | Refills: 0 | Status: SHIPPED | OUTPATIENT
Start: 2019-07-17

## 2019-07-17 RX ORDER — HYDROMORPHONE HYDROCHLORIDE 2 MG/1
2-4 TABLET ORAL
Status: DISCONTINUED | OUTPATIENT
Start: 2019-07-17 | End: 2019-07-18 | Stop reason: HOSPADM

## 2019-07-17 RX ORDER — CELECOXIB 200 MG/1
200 CAPSULE ORAL DAILY
Status: DISCONTINUED | OUTPATIENT
Start: 2019-07-17 | End: 2019-07-18 | Stop reason: HOSPADM

## 2019-07-17 RX ORDER — HYDROMORPHONE HYDROCHLORIDE 2 MG/1
2 TABLET ORAL
Qty: 50 TABLET | Refills: 0 | Status: SHIPPED | OUTPATIENT
Start: 2019-07-17 | End: 2019-07-17

## 2019-07-17 RX ORDER — ASPIRIN 325 MG
650 TABLET, DELAYED RELEASE (ENTERIC COATED) ORAL DAILY
Qty: 60 TABLET | Refills: 0 | Status: SHIPPED | OUTPATIENT
Start: 2019-07-17

## 2019-07-17 RX ORDER — HYDROMORPHONE HYDROCHLORIDE 2 MG/1
2-4 TABLET ORAL
Qty: 50 TABLET | Refills: 0 | Status: SHIPPED | OUTPATIENT
Start: 2019-07-17

## 2019-07-17 RX ADMIN — HYDROMORPHONE HYDROCHLORIDE 2 MG: 2 TABLET ORAL at 12:16

## 2019-07-17 RX ADMIN — HYDROMORPHONE HYDROCHLORIDE 4 MG: 2 TABLET ORAL at 15:53

## 2019-07-17 RX ADMIN — HYDROMORPHONE HYDROCHLORIDE 0.2 MG: 1 INJECTION, SOLUTION INTRAMUSCULAR; INTRAVENOUS; SUBCUTANEOUS at 14:55

## 2019-07-17 RX ADMIN — HYDROXYZINE HYDROCHLORIDE 50 MG: 25 TABLET, FILM COATED ORAL at 09:43

## 2019-07-17 RX ADMIN — HYDROMORPHONE HYDROCHLORIDE 2 MG: 2 TABLET ORAL at 05:38

## 2019-07-17 RX ADMIN — HYDROMORPHONE HYDROCHLORIDE 4 MG: 2 TABLET ORAL at 20:30

## 2019-07-17 RX ADMIN — KETOROLAC TROMETHAMINE 30 MG: 30 INJECTION, SOLUTION INTRAMUSCULAR at 04:31

## 2019-07-17 RX ADMIN — ASPIRIN 325 MG: 325 TABLET, DELAYED RELEASE ORAL at 08:15

## 2019-07-17 RX ADMIN — HYDROMORPHONE HYDROCHLORIDE 2 MG: 2 TABLET ORAL at 08:18

## 2019-07-17 RX ADMIN — HYDROMORPHONE HYDROCHLORIDE 0.2 MG: 1 INJECTION, SOLUTION INTRAMUSCULAR; INTRAVENOUS; SUBCUTANEOUS at 09:43

## 2019-07-17 RX ADMIN — CELECOXIB 200 MG: 200 CAPSULE ORAL at 15:52

## 2019-07-17 NOTE — PROGRESS NOTES
"Shana Garcia  2019  POD # 1 s/p TKA    Doing well.  No immediate surgical complications identified.  Pain well-controlled.  Tolerating physical therapy and rehabilitation well.    Blood pressure 131/87, pulse 84, temperature 98  F (36.7  C), temperature source Oral, resp. rate 18, height 1.651 m (5' 5\"), weight 113.9 kg (251 lb), SpO2 98 %.  Temperatures:  Current - Temp: 98  F (36.7  C); Max - Temp  Av.6  F (35.9  C)  Min: 95.5  F (35.3  C)  Max: 98.3  F (36.8  C)  Pulse range: Pulse  Av.8  Min: 65  Max: 84  Blood pressure range: Systolic (24hrs), Av , Min:94 , Max:163   ; Diastolic (24hrs), Av, Min:46, Max:97    CMS: intact  Dressing being changed-c/d/i  Calf soft, non-tender    Labs:  No results found for: HGB]  No results found for: INR  No results found for: PLT    PLAN:  Continue physical therapy  Discharge plan: Home later today with outpatient PT  "

## 2019-07-17 NOTE — PLAN OF CARE
Discharge Planner PT   Patient plan for discharge: Home with outpatient PT  Current status: Pt is min assist for bed mobility due to difficulty lifting LE due to pain/weakness. Pt SBA for sit to stand transfers. Pt amb 200' with FWW and SBA with KI in place. Pt up/down 3 stairs x2 with B rails and CGA. Pt johnathan ROM and strengthening exercises, demonstrates quad weakness, unable to do SLR without assistance. Pt ROM 20-75 degrees flexion, rates pain 6/10 after activity.  Barriers to return to prior living situation: Decreased activity tolerance, decreased strength, decreased ROM  Recommendations for discharge: Home with assist from spouse especially for stairs, outpatient PT  Rationale for recommendations: Pt is able to complete mobility necessary for home but fatigues very quickly, anticipate need for assist with stairs for this reason. Outpatient PT to further progress strength, ROM, activity tolerance, and optimize overall functional recovery.       Entered by: Blanca Landry 07/17/2019 9:17 AM      PM Session: Pt significantly limited by pain, c/o 10/10 pain with all mobility. Pt SBA for sit to stand transfers with increased time and effort needed. Pt CGA to ambulate 50' with FWW, KI in place. Pt with short step length and stance time. Pt up/down 3 stairs x2 with B rails and CGA. No knee buckling noted but pt c/o feeling dizzy and lightheaded with 10/10 pain with activity. Of concern, pt has 21 stairs to enter apartment, no elevator access and is demonstrating decreased tolerance to ambulation and stair training due to pain. Pt also demonstrating decreased knee ROM due to pain. Anticipate pt will have a very difficult time entering and leaving home at this current level of functional mobility and pain control.

## 2019-07-17 NOTE — PROGRESS NOTES
Patient has returned to baseline mental status: goal met  Patient vital signs are at baseline: goal met  Patient able to ambulate as they were prior to admission or with assist devices provided by therapies during their stay: goal met  Patient voiding : goal met  Patient able to tolerate oral intake: goal met  Patient has adequate pain control using Oral analgesics: goal met

## 2019-07-17 NOTE — PROGRESS NOTES
Patient has returned to baseline mental status: yes  Patient vital signs are at baseline: yes  Patient able to ambulate as they were prior to admission or with assist devices provided by therapies during their stay: yes  Patient voiding: voiding  Patient able to tolerate oral intake: yes  Patient has adequate pain control using Oral analgesics: yes

## 2019-07-17 NOTE — PROGRESS NOTES
Patient has returned to baseline mental status yes  Patient vital signs are at baseline yes  Patient able to ambulate as they were prior to admission or with assist devices provided by therapies during their stay yes  Patient voiding yes  Patient able to tolerate oral intake yes  Patient has adequate pain control using Oral analgesics No, required IV dilaudid x2 for 10/10 pain after therapy    Updated Dr. Kent, new order for celebrex and PO dilaudid order modified to better cover pain. Discharge order changed to 7/18 per MD to improve better pain control coverage.

## 2019-07-17 NOTE — PROGRESS NOTES
Patient has returned to baseline mental status: goal met  Patient vital signs are at baseline: goal met  Patient able to ambulate as they were prior to admission or with assist devices provided by therapies during their stay: no, 2nd therapy appointment in AM  Patient voiding: goal met  Patient able to tolerate oral intake: goal met  Patient has adequate pain control using Oral analgesics: took first dose of PO dilaudid at 1900. Will monitor, then update.

## 2019-07-17 NOTE — PROGRESS NOTES
Discharge Goals:  Patient has returned to baseline mental status: met  Patient vital signs are at baseline: met  Patient able to ambulate as they were prior to admission or with assist devices provided by therapies during their stay: met, up with SBA  Patient voiding: yes  Patient able to tolerate oral intake: yes  Patient has adequate pain control using Oral analgesics: yes, utilizing PO dilaudid and scheduled toradol.

## 2019-07-17 NOTE — PROGRESS NOTES
Patient is A&O x4. VSS on RA, home CPAP used at night. CMS intact. Dressing CDI, changed in AM. Hemovac x1 with minimal drainage, was removed in AM. C/o of 4/10 left knee pain, controlled with prn PO dilaudid and scheduled IV toradol. BS audible and active, passing gas, denies nausea. Voiding in BR. Regular diet. Up with SBA, GB/W. Plan on continuing to monitor/manage pain and discharge home today.

## 2019-07-17 NOTE — PROGRESS NOTES
Patient has returned to baseline mental status yes  Patient vital signs are at baseline yes  Patient able to ambulate as they were prior to admission or with assist devices provided by therapies during their stay yes  Patient voiding yes  Patient able to tolerate oral intake yes  Patient has adequate pain control using Oral analgesics yes; Used IV dilaudid 0.2 x2

## 2019-07-18 ENCOUNTER — APPOINTMENT (OUTPATIENT)
Dept: PHYSICAL THERAPY | Facility: CLINIC | Age: 59
End: 2019-07-18
Attending: ORTHOPAEDIC SURGERY
Payer: COMMERCIAL

## 2019-07-18 VITALS
RESPIRATION RATE: 16 BRPM | OXYGEN SATURATION: 94 % | SYSTOLIC BLOOD PRESSURE: 151 MMHG | BODY MASS INDEX: 41.82 KG/M2 | DIASTOLIC BLOOD PRESSURE: 84 MMHG | HEART RATE: 86 BPM | TEMPERATURE: 98.1 F | WEIGHT: 251 LBS | HEIGHT: 65 IN

## 2019-07-18 PROCEDURE — 97530 THERAPEUTIC ACTIVITIES: CPT | Mod: GP

## 2019-07-18 PROCEDURE — 25000132 ZZH RX MED GY IP 250 OP 250 PS 637: Performed by: ORTHOPAEDIC SURGERY

## 2019-07-18 PROCEDURE — 97116 GAIT TRAINING THERAPY: CPT | Mod: GP

## 2019-07-18 RX ADMIN — HYDROMORPHONE HYDROCHLORIDE 4 MG: 2 TABLET ORAL at 08:15

## 2019-07-18 RX ADMIN — ASPIRIN 325 MG: 325 TABLET, DELAYED RELEASE ORAL at 08:16

## 2019-07-18 RX ADMIN — CELECOXIB 200 MG: 200 CAPSULE ORAL at 08:16

## 2019-07-18 RX ADMIN — HYDROMORPHONE HYDROCHLORIDE 4 MG: 2 TABLET ORAL at 00:41

## 2019-07-18 RX ADMIN — HYDROMORPHONE HYDROCHLORIDE 4 MG: 2 TABLET ORAL at 13:35

## 2019-07-18 NOTE — DISCHARGE INSTRUCTIONS
Rev 3/5/2019  TOTAL KNEE REPLACEMENT TAKE HOME INSTRUCTIONS   Your surgeon will answer any questions about your progress. General guidelines for your care are listed below. Your surgeon may give additional instructions for your care at home. Please follow them carefully    Activity Level  1. Physical activity may be resumed gradually according to your comfort level and your surgeon s instructions. Follow your exercise program as instructed by your therapist. Do exercises at least twice a day. you may ice your knee after exercising.  2. Do not wear your knee immobilizer unless your doctor has specifically told you to continue it.    Good Health Practices  1. Maintain an adequate fluid intake and eat a well balanced diet.  2. Be sure to include the basic food groups, such as dairy products, meat/fish, vegetables, and fruit. Each of these foods contribute to helping your wound heal and increasing your strength.  3. Surgery, decreased activity and pain medication all contribute to a decrease in bowel activity that can result in constipation. It is recommended that you increase your liquid intake, add fiber to your diet, increase activity, and decrease pain medication use. If you have any problems, notify your physician.  4. Notify your dentist of your total knee surgery and call your dentist one week before a dental appointment for antibiotics.      Things to Watch For  1. Check incision daily for increased redness, tenderness, swelling, or drainage along the incision line. If these occur, please notify your doctor. Also, call if you develop a fever above 101 .  2. Please notify your doctor if you experience any calf pain and/or if you have surgical pain not relieved by the pain medication prescribed by your doctor.

## 2019-07-18 NOTE — PLAN OF CARE
Discharge Planner PT   Patient plan for discharge: Home with outpatient PT  Current status: Pt is min assist for lifting LE during supine<>sit due to pain/weakness. Pt SBA for sit to stand transfers using FWW. Pt amb 200' x2 with FWW and SBA with KI in place. Pt up/down 3 stairs x3 with B rails and CGA. Pt reporting improved pain compared to yesterday.  Barriers to return to prior living situation: Decreased activity tolerance, decreased strength, decreased ROM, 21 stairs to enter apt with no elevator  Recommendations for discharge: Home with assist from spouse for stairs and bed mobility, outpatient PT  Rationale for recommendations: Pt is able to complete mobility necessary for home but fatigues very quickly, anticipate need for assist with stairs for this reason. Outpatient PT to further progress strength, ROM, activity tolerance, and optimize overall functional recovery.    Physical Therapy Discharge Summary    Reason for therapy discharge:    Discharged to home with outpatient therapy.    Progress towards therapy goal(s). See goals on Care Plan in [facility] electronic health record for goal details.  Goals met    Therapy recommendation(s):    Continued therapy is recommended.  Rationale/Recommendations:  skilled OP PT services to progress ROM, strength, activity tolerance, and IND in functional mobility.           Entered by: Lyudmila Obrien 07/18/2019 9:07 AM

## 2019-07-18 NOTE — PROGRESS NOTES
[id number]  Patient has returned to baseline mental status: yes  Patient vital signs are at baseline: yes  Patient able to ambulate as they were prior to admission or with assist devices provided by therapies during their stay: yes  Patient voiding: yes  Patient able to tolerate oral intake: yes  Patient has adequate pain control using Oral analgesics: yes, pain control improving now with PO dilaudid.     A&Ox4. CMS intact. Scant dried drainage on dressing, intact. Knee immobilizer in place. Pain control improving. Up with the assist of one. Will continue to monitor.

## 2019-07-18 NOTE — PLAN OF CARE
Pt required second night stay do to uncontrolled pain requiring IV narcotic medication and failure to pass PT.    No events overnight.  Pain controlled.  Anticipate discharge today.

## 2019-07-18 NOTE — PLAN OF CARE
[id number]  Patient has returned to baseline mental status: yes  Patient vital signs are at baseline: yes  Patient able to ambulate as they were prior to admission or with assist devices provided by therapies during their stay: yes  Patient voiding: yes  Patient able to tolerate oral intake: yes  Patient has adequate pain control using Oral analgesics: yes, PO dilaudid given

## 2019-07-18 NOTE — PLAN OF CARE
Patient has returned to baseline mental status: yes  Patient vital signs are at baseline: yes  Patient able to ambulate as they were prior to admission or with assist devices provided by therapies during their stay: yes  Patient voiding: yes  Patient able to tolerate oral intake: yes  Patient has adequate pain control using Oral analgesics: yes, PO dilaudid given        No events overnight. A&O. VSS on RA; wore home CPAP overnight. CMS intact. JODI incision, dressing CDI. Dilaudid and ice given for pain. Up with 1,regular diet. discharge in AM before 11 am - need prescription script signed. Will continue to monitor.

## 2019-07-18 NOTE — DISCHARGE SUMMARY
Admit Date:     2019   Discharge Date:           HOSPITALIZATION REASON:  Elective total knee arthroplasty.      HOSPITAL COURSE:  On the day of admission, the patient underwent a total knee arthroplasty under spinal anesthetic.  She was taken to the floor postoperatively where her initial course was uncomplicated.  Her pain was well tolerated, and she was participating in therapies.  On the first postoperative day, she did well initially, but as the day progressed, her pain increased and was uncontrolled, requiring intravenous narcotics x 2.  She also was unable to pass physical therapy.  Decision was made for her to stay a second night due to unsafe conditions and inability to be on oral medications.  On the day of discharge, she was afebrile, her vital signs were stable, her pain was controlled with oral narcotics, and she was participating in therapies.  She will be discharged home on her regular home medications in addition to twice daily aspirin for DVT prophylaxis and oral narcotics for pain.  Outpatient physical therapy has been arranged.  Follow up in clinic in 2 weeks with me sooner if there are problems or concerns.         KETAN MAHAJAN MD             D: 2019   T: 2019   MT: SELENA      Name:     KRANTHI PEARSON   MRN:      [id number]        Account:        [id number]   :      1960           Admit Date:     2019                                  Discharge Date:       Document: [id number]

## 2019-07-18 NOTE — PROGRESS NOTES
Patient has returned to baseline mental status yes  Patient vital signs are at baseline yes  Patient able to ambulate as they were prior to admission or with assist devices provided by therapies during their stay yes  Patient voiding yes  Patient able to tolerate oral intake yes  Patient has adequate pain control using Oral analgesics yes    Patient discharged home with .

## 2019-07-22 ENCOUNTER — AMBULATORY - HEALTHEAST (OUTPATIENT)
Dept: OTHER | Facility: CLINIC | Age: 59
End: 2019-07-22

## 2019-07-22 ENCOUNTER — DOCUMENTATION ONLY (OUTPATIENT)
Dept: OTHER | Facility: CLINIC | Age: 59
End: 2019-07-22

## 2021-05-30 ENCOUNTER — RECORDS - HEALTHEAST (OUTPATIENT)
Dept: ADMINISTRATIVE | Facility: CLINIC | Age: 61
End: 2021-05-30

## 2021-12-11 ENCOUNTER — OFFICE VISIT (OUTPATIENT)
Dept: URGENT CARE | Facility: URGENT CARE | Age: 61
End: 2021-12-11
Payer: COMMERCIAL

## 2021-12-11 ENCOUNTER — ANCILLARY PROCEDURE (OUTPATIENT)
Dept: GENERAL RADIOLOGY | Facility: CLINIC | Age: 61
End: 2021-12-11
Attending: PHYSICIAN ASSISTANT
Payer: COMMERCIAL

## 2021-12-11 VITALS
SYSTOLIC BLOOD PRESSURE: 165 MMHG | TEMPERATURE: 99.6 F | HEART RATE: 71 BPM | OXYGEN SATURATION: 100 % | DIASTOLIC BLOOD PRESSURE: 97 MMHG

## 2021-12-11 DIAGNOSIS — M25.531 RIGHT WRIST PAIN: Primary | ICD-10-CM

## 2021-12-11 DIAGNOSIS — M25.531 RIGHT WRIST PAIN: ICD-10-CM

## 2021-12-11 PROCEDURE — 73110 X-RAY EXAM OF WRIST: CPT | Mod: RT | Performed by: RADIOLOGY

## 2021-12-11 PROCEDURE — 99203 OFFICE O/P NEW LOW 30 MIN: CPT

## 2021-12-11 RX ORDER — PREDNISONE 20 MG/1
20 TABLET ORAL DAILY
Qty: 5 TABLET | Refills: 0 | Status: SHIPPED | OUTPATIENT
Start: 2021-12-11 | End: 2021-12-16

## 2021-12-11 NOTE — PATIENT INSTRUCTIONS
Patient Education     Self-Care for Strains and Sprains  Most minor strains and sprains can be treated with self-care. Recovering from a strain or sprain may take 6 to 8 weeks. Your self-care goal is to reduce pain and immobilize the injury to speed healing.  Support the injured area  Wrapping the injured area provides support for short, necessary activities. Be careful not to wrap the area too tightly. This could cut off the blood supply.    Support a wrist, elbow, or shoulder with a sling.    Wrap an ankle or knee with an elastic bandage.    Tape a finger or toe to the one next to it.  Use cold and heat  Cold reduces swelling. Both cold and heat reduce pain. Heat should not be used in the initial treatment of the injury. When using cold or heat, always place a thin towel between the pack and your skin.    Apply ice or a cold pack 10 to 15 minutes every hour you re awake for the first 2 days.    After the swelling goes down, use cold or heat to control pain. Don t use heat late in the day, since it can cause swelling when you re not active.  Rest and elevate  Rest and elevation help your injury heal faster.    Raise the injured area above your heart level.    Keep the injured area from moving.    Limit the use of the joint or limb.  Use medicine    Aspirin reduces pain and swelling. (Note: Don t give aspirin to a child 18 or younger unless prescribed by the doctor.)    Non-steroidal anti-inflammatory medicines, such as ibuprofen, may reduce pain and swelling, as well. Ask your healthcare provider for advice.    When to call your healthcare provider  Call your healthcare provider if:    The injured joint won t move, or bones make a grating sound when they move    You can t put weight on the injured area, even after 24 hours    The injured body part is cold, blue, tingling, or numb    The joint or limb appears bent or crooked.    Pain increases or doesn t improve in 4 days    When pressing along the injured area,  you notice a spot that is especially painful  [employer] last reviewed this educational content on 5/1/2018 2000-2021 [employer]. All rights reserved. This information is not intended as a substitute for professional medical care. Always follow your healthcare professional's instructions.

## 2021-12-11 NOTE — PROGRESS NOTES
SUBJECTIVE:   Shana Garcia is a 61 year old female presenting with a chief complaint of   Chief Complaint   Patient presents with     Urgent Care     Musculoskeletal Problem     right hand/wrist pain that started thursday night, was playing a video game on thursday night has rheumatoid arthritis         She is a new patient of [facility].  Patient presents with complaints of right wrist/forearm pain x 2 days.  No prior injury, but hx of deQuervain tenosynovitis.  RHD.  Patient denies trauma, but was playing videogames  2 nights ago.      Treatment:  Tylenol with codeine, heat, ice, ace wrap.      Review of Systems    Past Medical History:   Diagnosis Date     Anxiety      Arthritis     RA & osteoarthritis     CMC arthritis, thumb, degenerative      Complication of anesthesia     difficult intubation, use glidescope, grade 2 view with glidescope     Depression      DUB (dysfunctional uterine bleeding)      Gastroesophageal reflux disease      Herpes simplex virus infection      Hypercholesteremia      Hyperlipidemia      Hypertension      Hypothyroidism      IFG (impaired fasting glucose)      Obese     morbid obesity > 41     Oral aphthae      Sleep apnea      No family history on file.  Current Outpatient Medications   Medication Sig Dispense Refill     aspirin (ASA) 325 MG EC tablet Take 2 tablets (650 mg) by mouth daily 60 tablet 0     calcium carbonate 600 mg-vitamin D 400 units (CALTRATE) 600-400 MG-UNIT per tablet Take 1-2 tablets by mouth every morning       folic acid (FOLVITE) 1 MG tablet Take 1 mg by mouth 2 times daily (Tuesdays through Sundays)       folic acid 5 MG CAPS Take 5 mg by mouth once a week (On Monday)   With Methotrexate Day       HYDROmorphone (DILAUDID) 2 MG tablet Take 1-2 tablets (2-4 mg) by mouth every 3 hours as needed for moderate to severe pain 50 tablet 0     hydrOXYzine (ATARAX) 25 MG tablet Take 1 tablet (25 mg) by mouth every 6 hours as needed for other (adjuvant pain) 25  tablet 0     levothyroxine (SYNTHROID/LEVOTHROID) 175 MCG tablet Take 175 mcg by mouth every morning       lisinopril-hydrochlorothiazide (PRINZIDE/ZESTORETIC) 20-12.5 MG tablet Take 2 tablets by mouth every morning       MELATONIN PO Take 1 tablet by mouth nightly as needed       methotrexate 2.5 MG tablet Take 25 mg by mouth every 7 days (Monday Evenings)   (takes 10x2.5mg tablet = 25mg dose)       naproxen (NAPROSYN) 500 MG tablet Take 500 mg by mouth 2 times daily (with meals)       Omega-3 Fatty Acids (FISH OIL PO) Take 1 tablet by mouth daily       omeprazole (PRILOSEC) 20 MG DR capsule Take 20 mg by mouth every morning       pravastatin (PRAVACHOL) 40 MG tablet Take 40 mg by mouth every evening       senna-docusate (SENOKOT-S/PERICOLACE) 8.6-50 MG tablet Take 1-2 tablets by mouth 2 times daily Take while on oral narcotics to prevent or treat constipation. 30 tablet 0     sulfaSALAzine (AZULFIDINE) 500 MG tablet Take 1,000 mg by mouth 2 times daily (2 x 500 mg = 1000 mg dose)       vitamin C (ASCORBIC ACID) 500 MG tablet Take 500 mg by mouth every morning       Social History     Tobacco Use     Smoking status: Never Smoker     Smokeless tobacco: Never Used   Substance Use Topics     Alcohol use: Yes     Comment: 1-2 glasses of wine per week       OBJECTIVE  BP (!) 165/97   Pulse 71   Temp 99.6  F (37.6  C) (Tympanic)   SpO2 100%     Physical Exam  Vitals and nursing note reviewed.   Constitutional:       Appearance: Normal appearance. She is obese.   Cardiovascular:      Rate and Rhythm: Normal rate.   Musculoskeletal:      Comments: Right UE:  Good cap refill.  No edema.  Tenderness to thenar eminence and volar wrist.  Full ROM, slightly positive finkelstein's.  Painful thumb opposition.  Elbow exam unremarkable.  No snuff box tenderness.     Skin:     Comments: Right UE skin normal.     Neurological:      Mental Status: She is alert.   Psychiatric:         Mood and Affect: Mood normal.         Labs:  No  results found for this or any previous visit (from the past 24 hour(s)).    X-Ray was done, my findings are: NAD    ASSESSMENT:      ICD-10-CM    1. Right wrist pain  M25.531 XR Wrist Right G/E 3 Views     Orthopedic  Referral     Wrist/Arm/Hand Supplies Order for DME - ONLY FOR DME        Medical Decision Making:    Differential Diagnosis:  DJD, deQuervain tenosynovitis,     Serious Comorbid Conditions:  Adult:  reviewed    PLAN:    Patient placed in a thumb spica splint.  Rx for prednisone.  Ortho referral.  RICE.      Followup:    If not improving or if condition worsens, follow up with your Primary Care Provider, If not improving or if conditions worsens over the next 12-24 hours, go to the Emergency Department    There are no Patient Instructions on file for this visit.

## (undated) DEVICE — GLOVE PROTEXIS BLUE W/NEU-THERA 6.5  2D73EB65

## (undated) DEVICE — SOLUTION WOUND CLEANSING 3/4OZ 10% PVP EA-L3011FB-50

## (undated) DEVICE — PACK TOTAL KNEE SOP15TKFSD

## (undated) DEVICE — BLADE SAW SAGITTAL STRK 19.5X95X1.27MM 2108-109-000S15

## (undated) DEVICE — GLOVE PROTEXIS W/NEU-THERA 6.5  2D73TE65

## (undated) DEVICE — IMM KNEE 20" 0814-2660

## (undated) DEVICE — BLADE SAW RECIP STRK 70X12.5X1.2MM 0277-096-281

## (undated) DEVICE — SU ETHIBOND 0 CTX CR  8X18" CX31D

## (undated) DEVICE — BONE CEMENT MIXEVAC III HI VAC KIT  0206-015-000

## (undated) DEVICE — DRAIN ROUND W/RESERV KIT JACKSON PRATT 10FR 400ML SU130-402D

## (undated) DEVICE — SOL WATER IRRIG 1000ML BOTTLE 2F7114

## (undated) DEVICE — WRAP EZY KNEE

## (undated) DEVICE — MANIFOLD NEPTUNE 4 PORT 700-20

## (undated) DEVICE — NDL SPINAL 18GA 3.5" 405184

## (undated) DEVICE — SUCTION IRR SYSTEM W/O TIP INTERPULSE HANDPIECE 0210-100-000

## (undated) DEVICE — BNDG ELASTIC 4"X5YDS UNSTERILE 6611-40

## (undated) DEVICE — CAST PADDING 6" UNSTERILE 9046

## (undated) DEVICE — ESU GROUND PAD UNIVERSAL W/O CORD

## (undated) DEVICE — SYR 30ML SLIP TIP W/O NDL 302833

## (undated) DEVICE — GLOVE PROTEXIS POWDER FREE 6.5 ORTHOPEDIC 2D73ET65

## (undated) DEVICE — LINEN TOWEL PACK X5 5464

## (undated) DEVICE — CAST PADDING 6" STERILE 9046S

## (undated) DEVICE — SYR 01ML 27GA 0.5" NDL TBC 309623

## (undated) DEVICE — BONE CLEANING TIP INTERPULSE  0210-010-000

## (undated) DEVICE — DRSG ADAPTIC 3X8" 6113

## (undated) DEVICE — SU VICRYL 2-0 CT-1 27" UND J259H

## (undated) DEVICE — PREP CHLORAPREP 26ML TINTED ORANGE  260815

## (undated) RX ORDER — KETOROLAC TROMETHAMINE 30 MG/ML
INJECTION, SOLUTION INTRAMUSCULAR; INTRAVENOUS
Status: DISPENSED
Start: 2019-07-16

## (undated) RX ORDER — PROPOFOL 10 MG/ML
INJECTION, EMULSION INTRAVENOUS
Status: DISPENSED
Start: 2019-07-16

## (undated) RX ORDER — FENTANYL CITRATE 50 UG/ML
INJECTION, SOLUTION INTRAMUSCULAR; INTRAVENOUS
Status: DISPENSED
Start: 2019-07-16

## (undated) RX ORDER — ACYCLOVIR 200 MG/1
CAPSULE ORAL
Status: DISPENSED
Start: 2019-07-16

## (undated) RX ORDER — DEXAMETHASONE SODIUM PHOSPHATE 4 MG/ML
INJECTION, SOLUTION INTRA-ARTICULAR; INTRALESIONAL; INTRAMUSCULAR; INTRAVENOUS; SOFT TISSUE
Status: DISPENSED
Start: 2019-07-16

## (undated) RX ORDER — CEFAZOLIN SODIUM 2 G/100ML
INJECTION, SOLUTION INTRAVENOUS
Status: DISPENSED
Start: 2019-07-16

## (undated) RX ORDER — LIDOCAINE HYDROCHLORIDE 20 MG/ML
INJECTION, SOLUTION EPIDURAL; INFILTRATION; INTRACAUDAL; PERINEURAL
Status: DISPENSED
Start: 2019-07-16

## (undated) RX ORDER — ONDANSETRON 2 MG/ML
INJECTION INTRAMUSCULAR; INTRAVENOUS
Status: DISPENSED
Start: 2019-07-16

## (undated) RX ORDER — ROPIVACAINE HYDROCHLORIDE 2 MG/ML
INJECTION, SOLUTION EPIDURAL; INFILTRATION; PERINEURAL
Status: DISPENSED
Start: 2019-07-16